# Patient Record
Sex: FEMALE
[De-identification: names, ages, dates, MRNs, and addresses within clinical notes are randomized per-mention and may not be internally consistent; named-entity substitution may affect disease eponyms.]

---

## 2020-06-23 ENCOUNTER — HOSPITAL ENCOUNTER (EMERGENCY)
Dept: HOSPITAL 53 - M ED | Age: 30
Discharge: HOME | End: 2020-06-23
Payer: COMMERCIAL

## 2020-06-23 VITALS — HEIGHT: 64 IN | BODY MASS INDEX: 24.46 KG/M2 | WEIGHT: 143.3 LBS

## 2020-06-23 VITALS — DIASTOLIC BLOOD PRESSURE: 62 MMHG | SYSTOLIC BLOOD PRESSURE: 100 MMHG

## 2020-06-23 DIAGNOSIS — N94.6: Primary | ICD-10-CM

## 2020-06-23 DIAGNOSIS — Z79.899: ICD-10-CM

## 2020-06-23 DIAGNOSIS — I10: ICD-10-CM

## 2020-06-23 LAB
ALBUMIN SERPL BCG-MCNC: 3.5 GM/DL (ref 3.2–5.2)
ALT SERPL W P-5'-P-CCNC: 15 U/L (ref 12–78)
BASOPHILS # BLD AUTO: 0.1 10^3/UL (ref 0–0.2)
BASOPHILS NFR BLD AUTO: 0.5 % (ref 0–1)
BILIRUB CONJ SERPL-MCNC: 0.1 MG/DL (ref 0–0.2)
BILIRUB SERPL-MCNC: 0.4 MG/DL (ref 0.2–1)
CK MB CFR.DF SERPL CALC: 1.05
CK MB SERPL-MCNC: < 1 NG/ML (ref ?–3.6)
CK SERPL-CCNC: 95 U/L (ref 26–192)
EOSINOPHIL # BLD AUTO: 0.1 10^3/UL (ref 0–0.5)
EOSINOPHIL NFR BLD AUTO: 1 % (ref 0–3)
HCT VFR BLD AUTO: 37.3 % (ref 36–47)
HGB BLD-MCNC: 11.9 G/DL (ref 12–15.5)
LIPASE SERPL-CCNC: 72 U/L (ref 73–393)
LYMPHOCYTES # BLD AUTO: 1.1 10^3/UL (ref 1.5–5)
LYMPHOCYTES NFR BLD AUTO: 9.4 % (ref 24–44)
MCH RBC QN AUTO: 26.2 PG (ref 27–33)
MCHC RBC AUTO-ENTMCNC: 31.9 G/DL (ref 32–36.5)
MCV RBC AUTO: 82.2 FL (ref 80–96)
MONOCYTES # BLD AUTO: 0.8 10^3/UL (ref 0–0.8)
MONOCYTES NFR BLD AUTO: 6.9 % (ref 0–5)
NEUTROPHILS # BLD AUTO: 9.4 10^3/UL (ref 1.5–8.5)
NEUTROPHILS NFR BLD AUTO: 81.9 % (ref 36–66)
PLATELET # BLD AUTO: 275 10^3/UL (ref 150–450)
PROT SERPL-MCNC: 6.9 GM/DL (ref 6.4–8.2)
RBC # BLD AUTO: 4.54 10^6/UL (ref 4–5.4)
TROPONIN I SERPL-MCNC: < 0.02 NG/ML (ref ?–0.1)
WBC # BLD AUTO: 11.5 10^3/UL (ref 4–10)

## 2020-06-23 PROCEDURE — 99284 EMERGENCY DEPT VISIT MOD MDM: CPT

## 2020-06-23 PROCEDURE — 93976 VASCULAR STUDY: CPT

## 2020-06-23 PROCEDURE — 82550 ASSAY OF CK (CPK): CPT

## 2020-06-23 PROCEDURE — 85025 COMPLETE CBC W/AUTO DIFF WBC: CPT

## 2020-06-23 PROCEDURE — 83690 ASSAY OF LIPASE: CPT

## 2020-06-23 PROCEDURE — 96361 HYDRATE IV INFUSION ADD-ON: CPT

## 2020-06-23 PROCEDURE — 76830 TRANSVAGINAL US NON-OB: CPT

## 2020-06-23 PROCEDURE — 84702 CHORIONIC GONADOTROPIN TEST: CPT

## 2020-06-23 PROCEDURE — 82553 CREATINE MB FRACTION: CPT

## 2020-06-23 PROCEDURE — 80047 BASIC METABLC PNL IONIZED CA: CPT

## 2020-06-23 PROCEDURE — 74177 CT ABD & PELVIS W/CONTRAST: CPT

## 2020-06-23 PROCEDURE — 96374 THER/PROPH/DIAG INJ IV PUSH: CPT

## 2020-06-23 PROCEDURE — 84484 ASSAY OF TROPONIN QUANT: CPT

## 2020-06-23 PROCEDURE — 81001 URINALYSIS AUTO W/SCOPE: CPT

## 2020-06-23 PROCEDURE — 76856 US EXAM PELVIC COMPLETE: CPT

## 2020-06-23 PROCEDURE — 80076 HEPATIC FUNCTION PANEL: CPT

## 2020-06-23 NOTE — REP
PELVIC SONOGRAPHY:

 

HISTORY:  Severe cramping.

 

FINDINGS:  Transabdominal and transvaginal scanning are performed.  Uterine

dimensions are normal and 7.1 x 4.4 x 4.6 cm.  Endometrial echo 0.9 cm thick.

There is a Nabothian cyst in the cervix.  Visualized urinary bladder walls are

smooth.  There is a mild amount of cul-de-sac fluid consistent with physiologic

fluid.

 

Normal ovaries are seen.  The right ovary measures 2.7 x 1.8 x 2.2 cm.  Left

ovary dimensions are 3.4 x 1.9 x 1.9 cm.  Doppler flow is present in both

ovaries.  Resistive indices are 0.5 3.47 on the right and left respectively.

 

IMPRESSION:

 

No significant abnormality.  Physiologic fluid in the cul-de-sac.  Small

Nabothian cyst.  Normal ovaries and uterus.

 

 

Electronically Signed by

Nacho Santana MD 06/23/2020 11:16 A

## 2020-06-23 NOTE — REP
CT ABDOMEN AND PELVIS WITH IV CONTRAST:

 

TECHNIQUE:  Axial contrast-enhanced images from the lung bases to the pubic

symphysis using 100 mL Isovue-370 intravenous contrast material with multiplanar

reformations.

 

The visualized lung bases are clear.  The liver, spleen, adrenals, pancreas and

kidneys are normal in appearance. There is no abdominal aortic aneurysm.  There

is no adenopathy.  There is no free air.  There is no bowel wall thickening.  The

appendix is normal.  No pelvic mass is seen.  Mild free fluid in the pelvis is

likely physiologic.  Urinary bladder is not well distended and not well

evaluated.

 

IMPRESSION:

 

Mild free fluid in the pelvis is likely physiologic.  Normal appendix.  No acute

findings.

 

 

Electronically Signed by

Maciel Logan MD 06/24/2020 04:00 P

## 2021-02-18 ENCOUNTER — HOSPITAL ENCOUNTER (EMERGENCY)
Dept: HOSPITAL 53 - M ED | Age: 31
Discharge: HOME | End: 2021-02-18
Payer: COMMERCIAL

## 2021-02-18 VITALS — SYSTOLIC BLOOD PRESSURE: 137 MMHG | DIASTOLIC BLOOD PRESSURE: 83 MMHG

## 2021-02-18 VITALS — BODY MASS INDEX: 28.04 KG/M2 | WEIGHT: 164.24 LBS | HEIGHT: 64 IN

## 2021-02-18 DIAGNOSIS — Z79.899: ICD-10-CM

## 2021-02-18 DIAGNOSIS — B34.9: ICD-10-CM

## 2021-02-18 DIAGNOSIS — J06.9: Primary | ICD-10-CM

## 2021-02-18 DIAGNOSIS — F31.9: ICD-10-CM

## 2021-02-18 DIAGNOSIS — Z82.49: ICD-10-CM

## 2021-02-18 PROCEDURE — 99284 EMERGENCY DEPT VISIT MOD MDM: CPT

## 2021-02-18 PROCEDURE — 87804 INFLUENZA ASSAY W/OPTIC: CPT

## 2021-02-18 PROCEDURE — 87880 STREP A ASSAY W/OPTIC: CPT

## 2021-02-18 NOTE — CCD
INPATIENT CARDIOLOGY FOLLOW-UP    Name: Emilee Lou    Age: 60 y.o. Date of Admission: 8/10/2018  4:37 AM    Date of Service: 8/12/2018    Chief Complaint: Follow-up for atrial fibrillation    Interim History:  No new overnight cardiac complaints. Maintaining SR. Currently with no complaints of CP, respiratory distress, or palpitations.     Review of Systems:   Cardiac: As per HPI  General: No fever, chills  Pulmonary: As per HPI  HEENT: No visual disturbances, difficult swallowing  GI: No nausea, vomiting  Musculoskeletal: SCHULTZ x 4, no focal motor deficits  Skin: Intact, no rashes  Neuro/Psych: No headache or seizures    Problem List:  Patient Active Problem List   Diagnosis    COPD exacerbation ([address]    URI (upper respiratory infection)    Diverticulitis    Tobacco abuse    COPD (chronic obstructive pulmonary disease) ([address]    Tobacco use    Pulmonary emphysema (HCC)    [facility])    Elevated glucose    Atrial fibrillation ([address]    Anxiety    H/o episode of agitation at last hospitalization    Non compliance with medical treatment    Hypoxemia    Chest pain    DM w/o complication type II ([address]    Paroxysmal atrial fibrillation ([address]    Encounter for monitoring flecainide therapy    Anticoagulated on Coumadin    Acute on chronic respiratory failure with hypoxia (HCC)    Shortness of breath    Type 2 diabetes mellitus ([address]    Dysphagia    S/P right inguinal hernia repair    Thrush    Acute gastritis    Duodenitis    Peptic stricture of esophagus    Acute on chronic respiratory failure with hypoxemia (HCC)    Cigarette smoker    History of atrial fibrillation - currently in SR    PAF (paroxysmal atrial fibrillation) (HCC)    Dyspnea and respiratory abnormalities    Acute on chronic respiratory failure (HCC)    Moderate protein-calorie malnutrition (HCC)       Allergies:  No Known Allergies    Current Medications:  Current Facility-Administered Summarization Of Episode

                             Created on: 2021



COLTON BACON

External Reference #: [id number]

: 1990

Sex: Female



Demographics





                          Address                   [address]

[place]  [address]

 

                          Home Phone                [phone]

 

                          Preferred Language        English

 

                          Marital Status            

 

                          [place]ai Affiliation     NONE

 

                          Race                       or 

 

                          Ethnic Group               or 





Author





                          Author                    [facility]

 

                          Organization              [facility]

 

                          Address                   Unknown

 

                          Phone                     Unavailable







Support





                Name            Relationship    Address         Phone

 

                    DINESH BACON   Next Of Kin         [address]

[place], [place]  [id number] [phone]

 

                    [facility]             Next Of Kin         [address]

[place]

[place]  [address]                    Unavailable



                                  



Re-disclosure Warning

          The records that you are about to access may contain information from 
federally-assisted alcohol or drug abuse programs. If such information is 
present, then the following federally mandated warning applies: This information
has been disclosed to you from records protected by federal confidentiality 
rules (42 CFR part 2). The federal rules prohibit you from making any further 
disclosure of this information unless further disclosure is expressly permitted 
by the written consent of the person to whom it pertains or as otherwise 
permitted by 42 CFR part 2. A general authorization for the release of medical 
or other information is NOT sufficient for this purpose. The Federal rules 
restrict any use of the information to criminally investigate or prosecute any 
alcohol or drug abuse patient.The records that you are about to access may 
contain highly sensitive health information, the redisclosure of which is 
protected by Article 27-F of the [facility] Public Health law. If you 
continue you may have access to information: Regarding HIV / AIDS; Provided by 
facilities licensed or operated by the [facility] Office of Mental Health; 
or Provided by the [facility] Office for People With Developmental 
Disabilities. If such information is present, then the following [place] 
mandated warning applies: This information has been disclosed to you from 
confidential records which are protected by state law. State law prohibits you 
from making any further disclosure of this information without the specific 
written consent of the person to whom it pertains, or as otherwise permitted by 
law. Any unauthorized further disclosure in violation of state law may result in
a fine or residential sentence or both. A general authorization for the release of 
medical or other information is NOT sufficient authorization for further disc
losure.                                                          



Insurance Providers

          



             Payer name   Policy type / Coverage type Policy ID    Covered party

 ID Covered 

party's relationship to bazan Policy Bazan             Plan Information

 

          [facility] [employer] DUT[employer]           [id number]                        

     [id number] Medications   Medication Dose Route Frequency Provider Last Rate Last Dose    methylPREDNISolone sodium (SOLU-MEDROL) injection 40 mg  40 mg Intravenous Q6H Narendra Hric, DO   40 mg at 08/12/18 0548    sodium chloride flush 0.9 % injection 10 mL  10 mL Intravenous 2 times per day Narendra Hric, DO   10 mL at 08/10/18 1307    sodium chloride flush 0.9 % injection 10 mL  10 mL Intravenous PRN Narendra Hric, DO        albuterol (PROVENTIL) nebulizer solution 2.5 mg  2.5 mg Nebulization Q2H PRN Narendra Hric, DO        mometasone-formoterol (DULERA) 200-5 MCG/ACT inhaler 2 puff  2 puff Inhalation BID Narendra Hric, DO   2 puff at 08/12/18 0747    flecainide (TAMBOCOR) tablet 150 mg  150 mg Oral Q12H Narendra Hric, DO   150 mg at 08/11/18 2357    montelukast (SINGULAIR) tablet 10 mg  10 mg Oral Nightly Narendra Hric, DO   10 mg at 08/11/18 2000    pantoprazole (PROTONIX) tablet 40 mg  40 mg Oral QPM Narendra Hric, DO   40 mg at 08/11/18 1850    venlafaxine (EFFEXOR XR) extended release capsule 75 mg  75 mg Oral Lunch Narendra Hric, DO   75 mg at 08/11/18 1212    sodium chloride flush 0.9 % injection 10 mL  10 mL Intravenous 2 times per day Narendra Hric, DO   10 mL at 08/11/18 2000    sodium chloride flush 0.9 % injection 10 mL  10 mL Intravenous PRN Narendra Hric, DO   10 mL at 08/11/18 0530    magnesium hydroxide (MILK OF MAGNESIA) 400 MG/5ML suspension 30 mL  30 mL Oral Daily PRN Narendra Hric, DO        ondansetron (ZOFRAN) injection 4 mg  4 mg Intravenous Q6H PRN Narendra Hric, DO        ipratropium-albuterol (DUONEB) nebulizer solution 1 ampule  1 ampule Inhalation Q4H WA Narendra Hric, DO   1 ampule at 08/12/18 0747    warfarin (COUMADIN) daily dosing (placeholder)   Other RX Placeholder Narendra Hric, DO             Physical Exam:  /66   Pulse 91   Temp 98.6 °F (37 °C) (Oral)   Resp 18   Ht 5' 5\" (1.651 m)   Wt 118 lb 11.2 oz (53.8 kg)   SpO2 99%   BMI 19.75 kg/m²   Wt Readings from Last 3 Encounters:   08/12/18 118 lb 11.2 oz (53.8 kg)   07/18/18 125 lb (56.7 kg)   07/07/18 125 lb (56.7 kg)     Appearance: Awake, alert, no acute respiratory distress  Skin: Intact, no rash  Head: Normocephalic, atraumatic  Eyes: EOMI, no conjunctival erythema  ENMT: No pharyngeal erythema, MMM, no rhinorrhea  Neck: Supple, no elevated JVP, no carotid bruits  Lungs: Decreased BS B/L, no wheezing  Cardiac: Regular rate and rhythm, no murmurs apparent  Abdomen: Soft, nontender, +bowel sounds  Extremities: Moves all extremities x 4, no lower extremity edema  Neurologic: No focal motor deficits apparent, normal mood and affect    Intake/Output:    Intake/Output Summary (Last 24 hours) at 08/12/18 0802  Last data filed at 08/11/18 1809   Gross per 24 hour   Intake              180 ml   Output                0 ml   Net              180 ml     No intake/output data recorded.     Laboratory Tests:  Recent Labs      08/10/18   0505  08/11/18   0540   NA  143  141   K  4.0  4.8   CL  101  102   CO2  32*  31*   BUN  14  21*   CREATININE  0.7  0.6*   GLUCOSE  113*  154*   CALCIUM  8.7  8.6     Lab Results   Component Value Date    MG 2.0 08/11/2018     Recent Labs      08/11/18   0540   ALKPHOS  68   ALT  11   AST  13   PROT  5.5*   BILITOT  <0.2   LABALBU  3.2*     Recent Labs      08/10/18   0505  08/11/18   0540   WBC  9.2  15.5*   RBC  4.28  3.98   HGB  12.1*  11.3*   HCT  38.2  35.9*   MCV  89.3  90.2   MCH  28.3  28.4   MCHC  31.7*  31.5*   RDW  14.0  14.0   PLT  312  320   MPV  9.6  10.0     Lab Results   Component Value Date    CKTOTAL 61 08/10/2018    CKMB 5.2 08/10/2018    TROPONINI <0.01 08/10/2018    TROPONINI <0.01 08/10/2018    TROPONINI <0.01 08/10/2018     Lab Results   Component Value Date    INR 2.3 08/11/2018    INR 2.1 08/10/2018    INR 1.5 07/07/2018    PROTIME 25.8 (H) 08/11/2018    PROTIME 22.8 (H) 08/10/2018    PROTIME 17.0 (H) 07/07/2018     Lab Results   Component Value Date    TSH 1.260 12/11/2017     Lab

## 2021-02-18 NOTE — CCD
Summarization Of Episode

                             Created on: 2021



COLTON BACON

External Reference #: [id number]

: 1990

Sex: Female



Demographics





                          Address                   [address]

[place]  [address]

 

                          Home Phone                [phone]

 

                          Preferred Language        English

 

                          Marital Status            

 

                          Christianity Affiliation     NONE

 

                          Race                       or 

 

                          Ethnic Group               or 





Author





                          Author                    [facility]              [facility]

 

                          Address                   Unknown

 

                          Phone                     Unavailable







Support





                Name            Relationship    Address         Phone

 

                    [facility]             Next Of Kin         [address]

[place]

[place]  [address]                    Unavailable



                                  



Re-disclosure Warning

          The records that you are about to access may contain information from 
federally-assisted alcohol or drug abuse programs. If such information is 
present, then the following federally mandated warning applies: This information
has been disclosed to you from records protected by federal confidentiality 
rules (42 CFR part 2). The federal rules prohibit you from making any further 
disclosure of this information unless further disclosure is expressly permitted 
by the written consent of the person to whom it pertains or as otherwise 
permitted by 42 CFR part 2. A general authorization for the release of medical 
or other information is NOT sufficient for this purpose. The Federal rules 
restrict any use of the information to criminally investigate or prosecute any 
alcohol or drug abuse patient.The records that you are about to access may 
contain highly sensitive health information, the redisclosure of which is 
protected by Article 27-F of the [facility] Public Health law. If you 
continue you may have access to information: Regarding HIV / AIDS; Provided by 
facilities licensed or operated by the [facility] Office of Mental Health; 
or Provided by the [facility] Office for People With Developmental 
Disabilities. If such information is present, then the following [place] 
mandated warning applies: This information has been disclosed to you from 
confidential records which are protected by state law. State law prohibits you 
from making any further disclosure of this information without the specific 
written consent of the person to whom it pertains, or as otherwise permitted by 
law. Any unauthorized further disclosure in violation of state law may result in
a fine or senior living sentence or both. A general authorization for the release of 
medical or other information is NOT sufficient authorization for further disc
losure.                                                          



Insurance Providers

          



             Payer name   Policy type / Coverage type Policy ID    Covered party

 ID Covered 

party's relationship to bazan Policy Bazan             Plan Information

 

          [facility] ACTIVE DUTY           [id number]                        

     [id number]

## 2021-10-05 ENCOUNTER — HOSPITAL ENCOUNTER (EMERGENCY)
Dept: HOSPITAL 53 - M ED | Age: 31
Discharge: HOME | End: 2021-10-05
Payer: COMMERCIAL

## 2021-10-05 VITALS — SYSTOLIC BLOOD PRESSURE: 143 MMHG | DIASTOLIC BLOOD PRESSURE: 88 MMHG

## 2021-10-05 VITALS — HEIGHT: 64 IN | WEIGHT: 167.99 LBS | BODY MASS INDEX: 28.68 KG/M2

## 2021-10-05 VITALS — OXYGEN SATURATION: 100 %

## 2021-10-05 DIAGNOSIS — F41.9: ICD-10-CM

## 2021-10-05 DIAGNOSIS — Z20.822: ICD-10-CM

## 2021-10-05 DIAGNOSIS — F32.9: ICD-10-CM

## 2021-10-05 DIAGNOSIS — J06.9: Primary | ICD-10-CM

## 2021-10-05 DIAGNOSIS — R51.9: ICD-10-CM

## 2021-10-05 DIAGNOSIS — Z79.899: ICD-10-CM

## 2021-10-05 DIAGNOSIS — R05.9: ICD-10-CM

## 2021-10-05 DIAGNOSIS — B34.9: ICD-10-CM

## 2022-08-24 ENCOUNTER — HOSPITAL ENCOUNTER (INPATIENT)
Dept: HOSPITAL 53 - M ED | Age: 32
LOS: 16 days | Discharge: PSYCHIATRIC OR SUBSTANCE USE FACILITY | DRG: 882 | End: 2022-09-09
Attending: STUDENT IN AN ORGANIZED HEALTH CARE EDUCATION/TRAINING PROGRAM | Admitting: STUDENT IN AN ORGANIZED HEALTH CARE EDUCATION/TRAINING PROGRAM
Payer: COMMERCIAL

## 2022-08-24 VITALS — WEIGHT: 156.53 LBS | HEIGHT: 64 IN | BODY MASS INDEX: 26.72 KG/M2

## 2022-08-24 DIAGNOSIS — Z59.89: ICD-10-CM

## 2022-08-24 DIAGNOSIS — G47.00: ICD-10-CM

## 2022-08-24 DIAGNOSIS — D50.9: ICD-10-CM

## 2022-08-24 DIAGNOSIS — Z79.899: ICD-10-CM

## 2022-08-24 DIAGNOSIS — F41.0: ICD-10-CM

## 2022-08-24 DIAGNOSIS — F43.10: Primary | ICD-10-CM

## 2022-08-24 DIAGNOSIS — R45.851: ICD-10-CM

## 2022-08-24 LAB
ALBUMIN SERPL BCG-MCNC: 3.5 GM/DL (ref 3.2–5.2)
ALT SERPL W P-5'-P-CCNC: 22 U/L (ref 12–78)
AMPHETAMINES UR QL SCN: NEGATIVE
APAP SERPL-MCNC: < 2 UG/ML (ref 10–30)
B-HCG SERPL QL: NEGATIVE
BARBITURATES UR QL SCN: NEGATIVE
BENZODIAZ UR QL SCN: NEGATIVE
BILIRUB CONJ SERPL-MCNC: 0.1 MG/DL (ref 0–0.2)
BILIRUB SERPL-MCNC: 0.2 MG/DL (ref 0.2–1)
BUN SERPL-MCNC: 12 MG/DL (ref 7–18)
BZE UR QL SCN: NEGATIVE
CALCIUM SERPL-MCNC: 8.6 MG/DL (ref 8.5–10.1)
CANNABINOIDS UR QL SCN: NEGATIVE
CHLORIDE SERPL-SCNC: 109 MEQ/L (ref 98–107)
CO2 SERPL-SCNC: 24 MEQ/L (ref 21–32)
CREAT SERPL-MCNC: 0.78 MG/DL (ref 0.55–1.3)
ETHANOL SERPL-MCNC: 0.01 % (ref 0–0.01)
GFR SERPL CREATININE-BSD FRML MDRD: > 60 ML/MIN/{1.73_M2} (ref 60–?)
GLUCOSE SERPL-MCNC: 93 MG/DL (ref 70–100)
HCT VFR BLD AUTO: 33.1 % (ref 36–47)
HGB BLD-MCNC: 10 G/DL (ref 12–15.5)
MCH RBC QN AUTO: 23.9 PG (ref 27–33)
MCHC RBC AUTO-ENTMCNC: 30.2 G/DL (ref 32–36.5)
MCV RBC AUTO: 79.2 FL (ref 80–96)
METHADONE UR QL SCN: NEGATIVE
OPIATES UR QL SCN: NEGATIVE
PCP UR QL SCN: NEGATIVE
PLATELET # BLD AUTO: 317 10^3/UL (ref 150–450)
POTASSIUM SERPL-SCNC: 4 MEQ/L (ref 3.5–5.1)
PROT SERPL-MCNC: 6.9 GM/DL (ref 6.4–8.2)
RBC # BLD AUTO: 4.18 10^6/UL (ref 4–5.4)
RSV RNA NPH QL NAA+PROBE: NEGATIVE
SALICYLATES SERPL-MCNC: < 1.7 MG/DL (ref 5–30)
SODIUM SERPL-SCNC: 139 MEQ/L (ref 136–145)
TSH SERPL DL<=0.005 MIU/L-ACNC: 2.08 UIU/ML (ref 0.36–3.74)
WBC # BLD AUTO: 6.6 10^3/UL (ref 4–10)

## 2022-08-24 SDOH — ECONOMIC STABILITY - INCOME SECURITY: OTHER PROBLEMS RELATED TO HOUSING AND ECONOMIC CIRCUMSTANCES: Z59.89

## 2022-08-25 RX ADMIN — ESCITALOPRAM OXALATE SCH MG: 10 TABLET, FILM COATED ORAL at 10:41

## 2022-08-25 RX ADMIN — FAMOTIDINE SCH MG: 20 TABLET, FILM COATED ORAL at 10:41

## 2022-08-26 VITALS — SYSTOLIC BLOOD PRESSURE: 118 MMHG | DIASTOLIC BLOOD PRESSURE: 71 MMHG

## 2022-08-26 RX ADMIN — FAMOTIDINE SCH MG: 20 TABLET, FILM COATED ORAL at 11:49

## 2022-08-26 RX ADMIN — IBUPROFEN PRN MG: 400 TABLET, FILM COATED ORAL at 20:28

## 2022-08-26 RX ADMIN — ESCITALOPRAM OXALATE SCH MG: 10 TABLET, FILM COATED ORAL at 11:50

## 2022-08-26 RX ADMIN — NICOTINE SCH PATCH: 21 PATCH, EXTENDED RELEASE TRANSDERMAL at 09:00

## 2022-08-27 VITALS — DIASTOLIC BLOOD PRESSURE: 70 MMHG | SYSTOLIC BLOOD PRESSURE: 138 MMHG

## 2022-08-27 RX ADMIN — IBUPROFEN PRN MG: 400 TABLET, FILM COATED ORAL at 12:03

## 2022-08-27 RX ADMIN — NICOTINE SCH PATCH: 21 PATCH, EXTENDED RELEASE TRANSDERMAL at 08:49

## 2022-08-28 VITALS — SYSTOLIC BLOOD PRESSURE: 142 MMHG | DIASTOLIC BLOOD PRESSURE: 83 MMHG

## 2022-08-28 VITALS — SYSTOLIC BLOOD PRESSURE: 110 MMHG | DIASTOLIC BLOOD PRESSURE: 62 MMHG

## 2022-08-28 LAB
FERRITIN SERPL-MCNC: 10 NG/ML (ref 8–252)
IRON SATN MFR SERPL: 3.9 % (ref 13.2–45)
IRON SERPL-MCNC: 19 UG/DL (ref 50–170)
TIBC SERPL-MCNC: 484 UG/DL (ref 250–450)

## 2022-08-28 RX ADMIN — PAROXETINE HYDROCHLORIDE SCH MG: 20 TABLET, FILM COATED ORAL at 08:58

## 2022-08-28 RX ADMIN — DIPHENHYDRAMINE HYDROCHLORIDE PRN MG: 25 CAPSULE ORAL at 21:16

## 2022-08-29 VITALS — SYSTOLIC BLOOD PRESSURE: 105 MMHG | DIASTOLIC BLOOD PRESSURE: 56 MMHG

## 2022-08-29 VITALS — DIASTOLIC BLOOD PRESSURE: 64 MMHG | SYSTOLIC BLOOD PRESSURE: 106 MMHG

## 2022-08-29 LAB
FOLATE SERPL-MCNC: 9.6 NG/ML (ref 5.4–?)
VIT B12 SERPL-MCNC: 419 PG/ML (ref 247–911)

## 2022-08-29 RX ADMIN — PAROXETINE HYDROCHLORIDE SCH MG: 20 TABLET, FILM COATED ORAL at 09:38

## 2022-08-30 VITALS — DIASTOLIC BLOOD PRESSURE: 59 MMHG | SYSTOLIC BLOOD PRESSURE: 116 MMHG

## 2022-08-30 VITALS — SYSTOLIC BLOOD PRESSURE: 119 MMHG | DIASTOLIC BLOOD PRESSURE: 58 MMHG

## 2022-08-30 RX ADMIN — BUPROPION HYDROCHLORIDE SCH MG: 150 TABLET, FILM COATED, EXTENDED RELEASE ORAL at 09:16

## 2022-08-30 RX ADMIN — PAROXETINE HYDROCHLORIDE SCH MG: 20 TABLET, FILM COATED ORAL at 21:55

## 2022-08-31 VITALS — DIASTOLIC BLOOD PRESSURE: 74 MMHG | SYSTOLIC BLOOD PRESSURE: 120 MMHG

## 2022-08-31 VITALS — SYSTOLIC BLOOD PRESSURE: 127 MMHG | DIASTOLIC BLOOD PRESSURE: 73 MMHG

## 2022-08-31 RX ADMIN — BUPROPION HYDROCHLORIDE SCH MG: 150 TABLET, FILM COATED, EXTENDED RELEASE ORAL at 08:58

## 2022-08-31 RX ADMIN — PAROXETINE HYDROCHLORIDE SCH MG: 20 TABLET, FILM COATED ORAL at 21:54

## 2022-09-01 VITALS — DIASTOLIC BLOOD PRESSURE: 63 MMHG | SYSTOLIC BLOOD PRESSURE: 124 MMHG

## 2022-09-01 VITALS — SYSTOLIC BLOOD PRESSURE: 130 MMHG | DIASTOLIC BLOOD PRESSURE: 62 MMHG

## 2022-09-01 LAB
CHOLEST SERPL-MCNC: 194 MG/DL (ref ?–200)
CHOLEST/HDLC SERPL: 4.13 {RATIO} (ref ?–5)
HDLC SERPL-MCNC: 47 MG/DL (ref 40–?)
LDLC SERPL CALC-MCNC: 133 MG/DL (ref ?–100)
NONHDLC SERPL-MCNC: 147 MG/DL
TRIGL SERPL-MCNC: 70 MG/DL (ref ?–150)

## 2022-09-01 RX ADMIN — PAROXETINE HYDROCHLORIDE SCH MG: 20 TABLET, FILM COATED ORAL at 20:28

## 2022-09-01 RX ADMIN — BUPROPION HYDROCHLORIDE SCH MG: 150 TABLET, FILM COATED, EXTENDED RELEASE ORAL at 09:09

## 2022-09-02 VITALS — SYSTOLIC BLOOD PRESSURE: 126 MMHG | DIASTOLIC BLOOD PRESSURE: 70 MMHG

## 2022-09-02 VITALS — SYSTOLIC BLOOD PRESSURE: 109 MMHG | DIASTOLIC BLOOD PRESSURE: 56 MMHG

## 2022-09-02 RX ADMIN — PAROXETINE HYDROCHLORIDE SCH MG: 20 TABLET, FILM COATED ORAL at 20:32

## 2022-09-02 RX ADMIN — BUPROPION HYDROCHLORIDE SCH MG: 150 TABLET, FILM COATED, EXTENDED RELEASE ORAL at 09:13

## 2022-09-03 VITALS — DIASTOLIC BLOOD PRESSURE: 78 MMHG | SYSTOLIC BLOOD PRESSURE: 120 MMHG

## 2022-09-03 VITALS — SYSTOLIC BLOOD PRESSURE: 118 MMHG | DIASTOLIC BLOOD PRESSURE: 56 MMHG

## 2022-09-03 RX ADMIN — BUPROPION HYDROCHLORIDE SCH MG: 150 TABLET, FILM COATED, EXTENDED RELEASE ORAL at 09:59

## 2022-09-03 RX ADMIN — PAROXETINE HYDROCHLORIDE SCH MG: 20 TABLET, FILM COATED ORAL at 20:50

## 2022-09-04 VITALS — DIASTOLIC BLOOD PRESSURE: 70 MMHG | SYSTOLIC BLOOD PRESSURE: 138 MMHG

## 2022-09-04 VITALS — DIASTOLIC BLOOD PRESSURE: 75 MMHG | SYSTOLIC BLOOD PRESSURE: 116 MMHG

## 2022-09-04 RX ADMIN — BUPROPION HYDROCHLORIDE SCH MG: 150 TABLET, FILM COATED, EXTENDED RELEASE ORAL at 08:32

## 2022-09-04 RX ADMIN — DIPHENHYDRAMINE HYDROCHLORIDE PRN MG: 25 CAPSULE ORAL at 11:38

## 2022-09-04 RX ADMIN — PAROXETINE HYDROCHLORIDE SCH MG: 20 TABLET, FILM COATED ORAL at 21:10

## 2022-09-05 VITALS — SYSTOLIC BLOOD PRESSURE: 104 MMHG | DIASTOLIC BLOOD PRESSURE: 57 MMHG

## 2022-09-05 VITALS — DIASTOLIC BLOOD PRESSURE: 67 MMHG | SYSTOLIC BLOOD PRESSURE: 118 MMHG

## 2022-09-05 RX ADMIN — DIPHENHYDRAMINE HYDROCHLORIDE PRN MG: 25 CAPSULE ORAL at 08:54

## 2022-09-05 RX ADMIN — BUPROPION HYDROCHLORIDE SCH MG: 150 TABLET, FILM COATED, EXTENDED RELEASE ORAL at 07:36

## 2022-09-05 RX ADMIN — PAROXETINE HYDROCHLORIDE SCH MG: 20 TABLET, FILM COATED ORAL at 20:28

## 2022-09-06 VITALS — DIASTOLIC BLOOD PRESSURE: 69 MMHG | SYSTOLIC BLOOD PRESSURE: 134 MMHG

## 2022-09-06 VITALS — SYSTOLIC BLOOD PRESSURE: 113 MMHG | DIASTOLIC BLOOD PRESSURE: 66 MMHG

## 2022-09-06 RX ADMIN — PAROXETINE HYDROCHLORIDE SCH MG: 20 TABLET, FILM COATED ORAL at 20:59

## 2022-09-06 RX ADMIN — BUPROPION HYDROCHLORIDE SCH MG: 150 TABLET, FILM COATED, EXTENDED RELEASE ORAL at 08:39

## 2022-09-07 VITALS — DIASTOLIC BLOOD PRESSURE: 78 MMHG | SYSTOLIC BLOOD PRESSURE: 112 MMHG

## 2022-09-07 RX ADMIN — PAROXETINE HYDROCHLORIDE SCH MG: 20 TABLET, FILM COATED ORAL at 20:58

## 2022-09-07 RX ADMIN — BUPROPION HYDROCHLORIDE SCH MG: 150 TABLET, FILM COATED, EXTENDED RELEASE ORAL at 10:15

## 2022-09-08 VITALS — DIASTOLIC BLOOD PRESSURE: 56 MMHG | SYSTOLIC BLOOD PRESSURE: 107 MMHG

## 2022-09-08 VITALS — SYSTOLIC BLOOD PRESSURE: 120 MMHG | DIASTOLIC BLOOD PRESSURE: 78 MMHG

## 2022-09-08 RX ADMIN — PAROXETINE HYDROCHLORIDE SCH MG: 20 TABLET, FILM COATED ORAL at 21:46

## 2022-09-08 RX ADMIN — BUPROPION HYDROCHLORIDE SCH MG: 150 TABLET, FILM COATED, EXTENDED RELEASE ORAL at 09:33

## 2023-05-22 ENCOUNTER — HOSPITAL ENCOUNTER (OUTPATIENT)
Dept: HOSPITAL 53 - M PLAIMG | Age: 33
End: 2023-05-22
Attending: NURSE PRACTITIONER
Payer: COMMERCIAL

## 2023-05-22 DIAGNOSIS — S60.042A: Primary | ICD-10-CM
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
## 2023-05-22 DIAGNOSIS — S60.021A: ICD-10-CM
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
## 2023-05-22 DIAGNOSIS — S60.051A: ICD-10-CM
